# Patient Record
Sex: FEMALE | Race: ASIAN | Employment: OTHER | URBAN - METROPOLITAN AREA
[De-identification: names, ages, dates, MRNs, and addresses within clinical notes are randomized per-mention and may not be internally consistent; named-entity substitution may affect disease eponyms.]

---

## 2019-05-20 ENCOUNTER — MED REC SCAN ONLY (OUTPATIENT)
Dept: NEUROLOGY | Facility: CLINIC | Age: 84
End: 2019-05-20

## 2019-05-20 ENCOUNTER — TELEPHONE (OUTPATIENT)
Dept: NEUROLOGY | Facility: CLINIC | Age: 84
End: 2019-05-20

## 2019-05-20 NOTE — PROGRESS NOTES
Neurology Outpatient initial note    Maddy Sanchez : 1932   HPI:     Maddy Sanchez is a 80year old female who is being seen in neurologic evaluation. Patient is being seen in evaluation for Parkinson's disease dementia.   She is accompanied by her daughter nightly. She is still taking Xanax. Overall delirium has improved and she is often able to be redirected. She does have some fatigue throughout the day and sleeps a lot, which family attributes partly to Seroquel.         Current Outpatient Medications: reviewed. No pertinent family history.    Social History:  Social History    Socioeconomic History      Marital status:       Spouse name: Not on file      Number of children: Not on file      Years of education: Not on file      Highest education le Severe stenosis    ASSESSMENT AND PLAN:   Assessment   1. 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   Additionally, per review of prior MRI reports, sp